# Patient Record
Sex: MALE | Race: WHITE | NOT HISPANIC OR LATINO | Employment: FULL TIME | URBAN - METROPOLITAN AREA
[De-identification: names, ages, dates, MRNs, and addresses within clinical notes are randomized per-mention and may not be internally consistent; named-entity substitution may affect disease eponyms.]

---

## 2020-02-25 ENCOUNTER — TELEPHONE (OUTPATIENT)
Dept: URGENT CARE | Facility: PHYSICIAN GROUP | Age: 35
End: 2020-02-25

## 2020-02-25 ENCOUNTER — OFFICE VISIT (OUTPATIENT)
Dept: URGENT CARE | Facility: PHYSICIAN GROUP | Age: 35
End: 2020-02-25
Payer: COMMERCIAL

## 2020-02-25 VITALS
DIASTOLIC BLOOD PRESSURE: 58 MMHG | BODY MASS INDEX: 23.34 KG/M2 | HEART RATE: 96 BPM | SYSTOLIC BLOOD PRESSURE: 98 MMHG | TEMPERATURE: 98.5 F | WEIGHT: 163 LBS | OXYGEN SATURATION: 98 % | HEIGHT: 70 IN | RESPIRATION RATE: 16 BRPM

## 2020-02-25 DIAGNOSIS — Z72.51 HIGH RISK HETEROSEXUAL BEHAVIOR: ICD-10-CM

## 2020-02-25 DIAGNOSIS — Z20.2 EXPOSURE TO CHLAMYDIA: ICD-10-CM

## 2020-02-25 PROCEDURE — 99203 OFFICE O/P NEW LOW 30 MIN: CPT | Mod: 25 | Performed by: PHYSICIAN ASSISTANT

## 2020-02-25 RX ORDER — AZITHROMYCIN 500 MG/1
1000 TABLET, FILM COATED ORAL ONCE
Qty: 2 TAB | Refills: 0 | Status: SHIPPED | OUTPATIENT
Start: 2020-02-25 | End: 2020-02-25

## 2020-02-26 ENCOUNTER — HOSPITAL ENCOUNTER (OUTPATIENT)
Facility: MEDICAL CENTER | Age: 35
End: 2020-02-26
Attending: PHYSICIAN ASSISTANT
Payer: COMMERCIAL

## 2020-02-26 DIAGNOSIS — Z20.2 EXPOSURE TO CHLAMYDIA: ICD-10-CM

## 2020-02-26 PROCEDURE — 87591 N.GONORRHOEAE DNA AMP PROB: CPT

## 2020-02-26 PROCEDURE — 87491 CHLMYD TRACH DNA AMP PROBE: CPT

## 2020-02-26 NOTE — TELEPHONE ENCOUNTER
Called the patient to notify him that he will need to leave a urine sample when he stops at the lab for his blood work he will need to leave a urine sample as well for the GC Chlamydia test.

## 2020-02-26 NOTE — PROGRESS NOTES
Subjective:      Dimas Magallon is a 35 y.o. male who presents with Sexually Transmitted Diseases (he advised that he was told yesterday to get tested )    HPI:   This is a new problem. Dimas Magallon is a 35 y.o. male who presents today for evaluation of possible STD.  Patient has been sexually active with multiple females in the past few months.  He says he was notified by 1 of his sexual partners that she had chlamydia.  He is not having any symptoms.  No overall penile discharge, burning with urination, joint pain, rash, abdominal pain, or fever/chills.  He has no history of STDs.      Review of Systems   Constitutional: Negative for chills, fever and malaise/fatigue.   HENT: Negative for sore throat.    Eyes: Negative for blurred vision.   Respiratory: Negative for shortness of breath.    Cardiovascular: Negative for chest pain and palpitations.   Gastrointestinal: Negative for abdominal pain, nausea and vomiting.   Genitourinary: Negative for dysuria, flank pain, frequency and urgency.   Musculoskeletal: Negative for joint pain.   Skin: Negative for rash.   Neurological: Negative for dizziness.   Endo/Heme/Allergies: Does not bruise/bleed easily.       PMH:  has no past medical history on file.  MEDS:   Current Outpatient Medications:   •  azithromycin (ZITHROMAX) 500 MG tablet, Take 2 Tabs by mouth Once for 1 dose., Disp: 2 Tab, Rfl: 0  •  fluticasone (FLONASE) 50 MCG/ACT nasal spray, Spray 2 Sprays in nose every day. Each Nostril (Patient not taking: Reported on 2/25/2020), Disp: 1 Bottle, Rfl: 0  •  oxycodone-acetaminophen (PERCOCET) 5-325 MG TABS, Take 1-2 Tabs by mouth every 6 hours as needed for Mild Pain. (Patient not taking: Reported on 2/25/2020), Disp: 10 Each, Rfl: 0    Current Facility-Administered Medications:   •  cefTRIAXone (ROCEPHIN) 250 mg, lidocaine (XYLOCAINE) 1 % 0.9 mL for IM use, 250 mg, Intramuscular, Once, Bertha Atkinson P.A.-C.  ALLERGIES:   Allergies   Allergen Reactions   • Vicodin  "[Hydrocodone-Acetaminophen]      SURGHX:   Past Surgical History:   Procedure Laterality Date   • EYE SURGERY     • OTHER ORTHOPEDIC SURGERY       SOCHX:  reports that he has been smoking cigarettes. He has been smoking about 0.50 packs per day. He does not have any smokeless tobacco history on file. He reports current alcohol use.  FH: Family history was reviewed, no pertinent findings to report     Objective:     BP (!) 98/58 (BP Location: Left arm, Patient Position: Sitting, BP Cuff Size: Adult)   Pulse 96   Temp 36.9 °C (98.5 °F) (Temporal)   Resp 16   Ht 1.778 m (5' 10\")   Wt 73.9 kg (163 lb)   SpO2 98%   BMI 23.39 kg/m²      Physical Exam  Constitutional:       Appearance: Normal appearance. He is well-developed.   HENT:      Head: Normocephalic and atraumatic.      Right Ear: External ear normal.      Left Ear: External ear normal.   Eyes:      Conjunctiva/sclera: Conjunctivae normal.      Pupils: Pupils are equal, round, and reactive to light.   Cardiovascular:      Rate and Rhythm: Normal rate and regular rhythm.      Heart sounds: No murmur.   Pulmonary:      Effort: Pulmonary effort is normal.      Breath sounds: Normal breath sounds.   Abdominal:      Palpations: Abdomen is soft.      Tenderness: There is no abdominal tenderness. There is no right CVA tenderness or left CVA tenderness.   Genitourinary:     Penis: Normal and circumcised. No discharge.       Scrotum/Testes: Normal. Cremasteric reflex is present.      Epididymis:      Right: Normal.      Left: Normal.   Lymphadenopathy:      Lower Body: No right inguinal adenopathy. No left inguinal adenopathy.   Skin:     General: Skin is warm and dry.      Capillary Refill: Capillary refill takes less than 2 seconds.   Neurological:      Mental Status: He is alert and oriented to person, place, and time.   Psychiatric:         Behavior: Behavior normal.         Judgment: Judgment normal.            Assessment/Plan:       1. Exposure to chlamydia  - " cefTRIAXone (ROCEPHIN) 250 mg, lidocaine (XYLOCAINE) 1 % 0.9 mL for IM use  - azithromycin (ZITHROMAX) 500 MG tablet; Take 2 Tabs by mouth Once for 1 dose.  Dispense: 2 Tab; Refill: 0  - CHLAMYDIA/GC PCR URINE OR SWAB; Future    2. High risk heterosexual behavior  - HIV AG/AB COMBO ASSAY SCREENING; Future  - T.PALLIDUM AB EIA; Future      *Patient was advised to abstain from sexual contact no the results of the STD tests are in.          Differential Diagnosis, natural history, and supportive care discussed. Return to the Urgent Care or follow up with your PCP if symptoms fail to resolve, or for any new or worsening symptoms. Emergency room precautions discussed. Patient and/or family appears understanding of information.

## 2020-02-28 LAB
C TRACH DNA SPEC QL NAA+PROBE: NEGATIVE
N GONORRHOEA DNA SPEC QL NAA+PROBE: NEGATIVE
SPECIMEN SOURCE: NORMAL

## 2020-03-01 ENCOUNTER — TELEPHONE (OUTPATIENT)
Dept: URGENT CARE | Facility: CLINIC | Age: 35
End: 2020-03-01

## 2020-03-02 NOTE — TELEPHONE ENCOUNTER
Phone Number Called: [phone] (home)       Call outcome: Spoke to patient regarding message below.    Message: did let pt know that the test for chlamydia and gc came back negative

## 2020-03-04 ASSESSMENT — ENCOUNTER SYMPTOMS
FLANK PAIN: 0
SHORTNESS OF BREATH: 0
BLURRED VISION: 0
CHILLS: 0
ABDOMINAL PAIN: 0
SORE THROAT: 0
VOMITING: 0
BRUISES/BLEEDS EASILY: 0
DIZZINESS: 0
FEVER: 0
NAUSEA: 0
PALPITATIONS: 0

## 2020-05-21 ENCOUNTER — OFFICE VISIT (OUTPATIENT)
Dept: URGENT CARE | Facility: PHYSICIAN GROUP | Age: 35
End: 2020-05-21
Payer: COMMERCIAL

## 2020-05-21 VITALS
RESPIRATION RATE: 16 BRPM | TEMPERATURE: 98 F | HEIGHT: 70 IN | HEART RATE: 82 BPM | OXYGEN SATURATION: 97 % | DIASTOLIC BLOOD PRESSURE: 84 MMHG | BODY MASS INDEX: 23.62 KG/M2 | SYSTOLIC BLOOD PRESSURE: 120 MMHG | WEIGHT: 165 LBS

## 2020-05-21 DIAGNOSIS — B35.4 TINEA CORPORIS: Primary | ICD-10-CM

## 2020-05-21 PROCEDURE — 99214 OFFICE O/P EST MOD 30 MIN: CPT | Performed by: PHYSICIAN ASSISTANT

## 2020-05-21 RX ORDER — HYDROXYZINE HYDROCHLORIDE 25 MG/1
25 TABLET, FILM COATED ORAL 3 TIMES DAILY PRN
Qty: 30 TAB | Refills: 0 | Status: SHIPPED | OUTPATIENT
Start: 2020-05-21 | End: 2020-05-31

## 2020-05-21 RX ORDER — FLUCONAZOLE 150 MG/1
150 TABLET ORAL DAILY
Qty: 21 TAB | Refills: 0 | Status: SHIPPED | OUTPATIENT
Start: 2020-05-21 | End: 2020-06-11

## 2020-05-21 NOTE — PROGRESS NOTES
Subjective:      Pt is a 35 y.o. male who presents with Rash (started in lower back going up to shoulders, between fingers, itchy, burning, x2 months )            HPI  ,This is a new problem. Pt notes 2 months of spreading and itching rash on back and moving to chest and hands with itching. He notes Lotrimin and Triamcinolone is not helping. Pt has not taken any Rx medications for this condition. Pt states the pain is a 5/10 with itching, aching in nature and worse at night. Pt denies new detergents, soaps, make-up, hygiene products, medications, foods, exposure to chemicals. Pt denies CP, SOB, NVD, paresthesias, headaches, dizziness, change in vision, hives, or other joint pain. The pt's medication list, problem list, and allergies have been evaluated and reviewed during today's visit.    PMH:  Negative per pt.      PSH:  Past Surgical History:   Procedure Laterality Date   • EYE SURGERY     • OTHER ORTHOPEDIC SURGERY         Fam Hx:    family history includes Hyperlipidemia in his father; Hypertension in his father.  Family Status   Relation Name Status   • Mo  Alive   • Fa  Alive       Soc HX:  Social History     Socioeconomic History   • Marital status: Single     Spouse name: Not on file   • Number of children: Not on file   • Years of education: Not on file   • Highest education level: Not on file   Occupational History   • Not on file   Social Needs   • Financial resource strain: Not on file   • Food insecurity     Worry: Not on file     Inability: Not on file   • Transportation needs     Medical: Not on file     Non-medical: Not on file   Tobacco Use   • Smoking status: Current Every Day Smoker     Packs/day: 0.50     Types: Cigarettes   • Smokeless tobacco: Never Used   Substance and Sexual Activity   • Alcohol use: Yes   • Drug use: Not on file   • Sexual activity: Not on file   Lifestyle   • Physical activity     Days per week: Not on file     Minutes per session: Not on file   • Stress: Not on file    "  Relationships   • Social connections     Talks on phone: Not on file     Gets together: Not on file     Attends Holiness service: Not on file     Active member of club or organization: Not on file     Attends meetings of clubs or organizations: Not on file     Relationship status: Not on file   • Intimate partner violence     Fear of current or ex partner: Not on file     Emotionally abused: Not on file     Physically abused: Not on file     Forced sexual activity: Not on file   Other Topics Concern   • Not on file   Social History Narrative   • Not on file         Medications:    Current Outpatient Medications:   •  hydrOXYzine HCl (ATARAX) 25 MG Tab, Take 1 Tab by mouth 3 times a day as needed for Itching for up to 10 days., Disp: 30 Tab, Rfl: 0  •  fluconazole (DIFLUCAN) 150 MG tablet, Take 1 Tab by mouth every day for 21 days., Disp: 21 Tab, Rfl: 0      Allergies:  Vicodin [hydrocodone-acetaminophen]    ROS  Constitutional: Negative for fever, chills and malaise/fatigue.   HENT: Negative for congestion and sore throat.    Eyes: Negative for blurred vision, double vision and photophobia.   Respiratory: Negative for cough and shortness of breath.  Cardiovascular: Negative for chest pain and palpitations.   Gastrointestinal: Negative for heartburn, nausea, vomiting, abdominal pain, diarrhea and constipation.   Genitourinary: Negative for dysuria and flank pain.   Musculoskeletal: Negative for joint pain and myalgias.   Skin: POS for itching and rash.   Neurological: Negative for dizziness, tingling and headaches.   Endo/Heme/Allergies: Does not bruise/bleed easily.   Psychiatric/Behavioral: Negative for depression. The patient is not nervous/anxious.           Objective:     /84 (BP Location: Right arm, Patient Position: Sitting, BP Cuff Size: Adult)   Pulse 82   Temp 36.7 °C (98 °F) (Temporal)   Resp 16   Ht 1.778 m (5' 10\")   Wt 74.8 kg (165 lb)   SpO2 97%   BMI 23.68 kg/m²      Physical " Exam  Skin:     General: Skin is warm.      Capillary Refill: Capillary refill takes less than 2 seconds.      Findings: Rash present. Rash is macular and papular.      Nails: There is no clubbing.                     Constitutional: PT is oriented to person, place, and time. PT appears well-developed and well-nourished. No distress.   HENT:   Head: Normocephalic and atraumatic.   Mouth/Throat: Oropharynx is clear and moist. No oropharyngeal exudate.   Eyes: Conjunctivae normal and EOM are normal. Pupils are equal, round, and reactive to light.   Neck: Normal range of motion. Neck supple. No thyromegaly present.   Cardiovascular: Normal rate, regular rhythm, normal heart sounds and intact distal pulses.  Exam reveals no gallop and no friction rub.    No murmur heard.  Pulmonary/Chest: Effort normal and breath sounds normal. No respiratory distress. PT has no wheezes. PT has no rales. Pt exhibits no tenderness.   Abdominal: Soft. Bowel sounds are normal. PT exhibits no distension and no mass. There is no tenderness. There is no rebound and no guarding.   Musculoskeletal: Normal range of motion. PT exhibits no edema and no tenderness.   Neurological: PT is alert and oriented to person, place, and time. PT has normal reflexes. No cranial nerve deficit.       Psychiatric: PT has a normal mood and affect. PT behavior is normal. Judgment and thought content normal.        Assessment/Plan:       1. Tinea corporis    - hydrOXYzine HCl (ATARAX) 25 MG Tab; Take 1 Tab by mouth 3 times a day as needed for Itching for up to 10 days.  Dispense: 30 Tab; Refill: 0  - fluconazole (DIFLUCAN) 150 MG tablet; Take 1 Tab by mouth every day for 21 days.  Dispense: 21 Tab; Refill: 0    Rash in nature of tinea and since topical triamcinolone and lotrimin did not work x 2 weeks I will move to oral antifungal: Diflucan and encourage continued OTC topical use of antifungal cream as well.  Rest, fluids encouraged.  AVS with medical info  given.  Pt was in full understanding and agreement with the plan.  Differential diagnosis, natural history, supportive care, and indications for immediate follow-up discussed. All questions answered. Patient agrees with the plan of care.  Follow-up as needed if symptoms worsen or fail to improve to PCP, Urgent care or Emergency Room.

## 2020-06-18 ENCOUNTER — OFFICE VISIT (OUTPATIENT)
Dept: URGENT CARE | Facility: PHYSICIAN GROUP | Age: 35
End: 2020-06-18
Payer: COMMERCIAL

## 2020-06-18 VITALS
OXYGEN SATURATION: 98 % | DIASTOLIC BLOOD PRESSURE: 80 MMHG | TEMPERATURE: 98.4 F | SYSTOLIC BLOOD PRESSURE: 118 MMHG | HEIGHT: 70 IN | WEIGHT: 165 LBS | RESPIRATION RATE: 18 BRPM | BODY MASS INDEX: 23.62 KG/M2 | HEART RATE: 82 BPM

## 2020-06-18 DIAGNOSIS — B35.4 TINEA CORPORIS: ICD-10-CM

## 2020-06-18 DIAGNOSIS — L98.9 SKIN LESION OF NECK: ICD-10-CM

## 2020-06-18 PROCEDURE — 99214 OFFICE O/P EST MOD 30 MIN: CPT | Performed by: NURSE PRACTITIONER

## 2020-06-18 RX ORDER — CLOTRIMAZOLE AND BETAMETHASONE DIPROPIONATE 10; .64 MG/G; MG/G
1 CREAM TOPICAL 2 TIMES DAILY
Qty: 1 TUBE | Refills: 1 | Status: SHIPPED | OUTPATIENT
Start: 2020-06-18 | End: 2021-03-12

## 2020-06-18 RX ORDER — FLUCONAZOLE 150 MG/1
TABLET ORAL
Qty: 21 TAB | Refills: 0 | Status: SHIPPED | OUTPATIENT
Start: 2020-06-18 | End: 2021-03-12

## 2020-06-18 RX ORDER — FLUCONAZOLE 150 MG/1
150 TABLET ORAL DAILY
COMMUNITY
End: 2021-03-12

## 2020-06-18 ASSESSMENT — ENCOUNTER SYMPTOMS
CONSTIPATION: 0
TINGLING: 0
WHEEZING: 0
FEVER: 0
HEADACHES: 0
CHILLS: 0
NAUSEA: 0
DIZZINESS: 0
SENSORY CHANGE: 0
MYALGIAS: 0
DIARRHEA: 0
ORTHOPNEA: 0
VOMITING: 0
PALPITATIONS: 0
WEAKNESS: 0
ABDOMINAL PAIN: 0
SHORTNESS OF BREATH: 0

## 2020-06-18 NOTE — PROGRESS NOTES
Subjective:      Dimas Magallon is a 35 y.o. male who presents with Rash (Rash on back and arms, ringworm, pt got medication for it but its not helping, x4 months )            HPI  C/o ringworm still present. Has lesions on left arm and 2 lesions on back. Seen in UC 1 month ago and placed on fluconazole. States rash improved but still present. Admits to sleeping with his dog. Unknown if dog has rash.     PMH:  has no past medical history on file.  MEDS:   Current Outpatient Medications:   •  clotrimazole-betamethasone (LOTRISONE) 1-0.05 % Cream, Apply 1 Application to affected area(s) 2 times a day., Disp: 1 Tube, Rfl: 1  •  fluconazole (DIFLUCAN) 150 MG tablet, Take 1 tab by mouth daily x 21 days., Disp: 21 Tab, Rfl: 0  •  fluconazole (DIFLUCAN) 150 MG tablet, Take 150 mg by mouth every day., Disp: , Rfl:   ALLERGIES:   Allergies   Allergen Reactions   • Vicodin [Hydrocodone-Acetaminophen]      SURGHX:   Past Surgical History:   Procedure Laterality Date   • EYE SURGERY     • OTHER ORTHOPEDIC SURGERY       SOCHX:  reports that he has been smoking cigarettes. He has been smoking about 0.50 packs per day. He has never used smokeless tobacco. He reports current alcohol use.  FH: Family history was reviewed, no pertinent findings to report    Review of Systems   Constitutional: Negative for chills, fever and malaise/fatigue.   Respiratory: Negative for shortness of breath and wheezing.    Cardiovascular: Negative for chest pain, palpitations and orthopnea.   Gastrointestinal: Negative for abdominal pain, constipation, diarrhea, nausea and vomiting.   Musculoskeletal: Negative for myalgias.   Skin: Positive for rash. Negative for itching.   Neurological: Negative for dizziness, tingling, sensory change, weakness and headaches.   Endo/Heme/Allergies: Negative for environmental allergies.   All other systems reviewed and are negative.         Objective:     /80 (BP Location: Right arm, Patient Position: Sitting, BP  "Cuff Size: Adult)   Pulse 82   Temp 36.9 °C (98.4 °F) (Temporal)   Resp 18   Ht 1.778 m (5' 10\")   Wt 74.8 kg (165 lb)   SpO2 98%   BMI 23.68 kg/m²      Physical Exam  Vitals signs reviewed.   Constitutional:       General: He is awake. He is not in acute distress.     Appearance: Normal appearance. He is well-developed and well-groomed. He is not ill-appearing, toxic-appearing or diaphoretic.   HENT:      Head: Normocephalic.   Eyes:      Conjunctiva/sclera: Conjunctivae normal.      Pupils: Pupils are equal, round, and reactive to light.   Neck:      Musculoskeletal: Normal range of motion and neck supple.   Cardiovascular:      Rate and Rhythm: Normal rate and regular rhythm.      Heart sounds: Normal heart sounds.   Pulmonary:      Effort: Pulmonary effort is normal. No accessory muscle usage or respiratory distress.   Musculoskeletal: Normal range of motion.   Skin:     General: Skin is warm and dry.      Findings: Erythema and rash present. Rash is macular.             Comments: 3 similar macular circular lesions on left arm and back.    Neurological:      Mental Status: He is alert and oriented to person, place, and time.   Psychiatric:         Behavior: Behavior is cooperative.                 Assessment/Plan:       1. Tinea corporis    - clotrimazole-betamethasone (LOTRISONE) 1-0.05 % Cream; Apply 1 Application to affected area(s) 2 times a day.  Dispense: 1 Tube; Refill: 1  - REFERRAL TO DERMATOLOGY  - fluconazole (DIFLUCAN) 150 MG tablet; Take 1 tab by mouth daily x 21 days.  Dispense: 21 Tab; Refill: 0    2. Skin lesion of neck (patient requesting for derm referral for chronic skin lesion)    - REFERRAL TO DERMATOLOGY    May clean area with mild soap, do not scrub, wash with tepid water, tap dry  May moisturize skin with neutral lotion like Gold Bond, Cerave, Eucerin  Monitor for increase in rash size or areas affected, pain, itchiness, redness with blistering- re-evaluate  F/u Derm for ringworm " for long term treatment and evaluation of lesion

## 2021-01-29 ENCOUNTER — HOSPITAL ENCOUNTER (OUTPATIENT)
Facility: MEDICAL CENTER | Age: 36
End: 2021-01-29
Attending: PHYSICIAN ASSISTANT
Payer: COMMERCIAL

## 2021-01-29 ENCOUNTER — OFFICE VISIT (OUTPATIENT)
Dept: URGENT CARE | Facility: CLINIC | Age: 36
End: 2021-01-29
Payer: COMMERCIAL

## 2021-01-29 VITALS
RESPIRATION RATE: 16 BRPM | BODY MASS INDEX: 23.62 KG/M2 | HEART RATE: 80 BPM | DIASTOLIC BLOOD PRESSURE: 72 MMHG | HEIGHT: 70 IN | TEMPERATURE: 97.2 F | SYSTOLIC BLOOD PRESSURE: 104 MMHG | WEIGHT: 165 LBS | OXYGEN SATURATION: 98 %

## 2021-01-29 DIAGNOSIS — R21 RASH AND NONSPECIFIC SKIN ERUPTION: ICD-10-CM

## 2021-01-29 DIAGNOSIS — N48.1 BALANITIS: ICD-10-CM

## 2021-01-29 DIAGNOSIS — R30.0 DYSURIA: ICD-10-CM

## 2021-01-29 DIAGNOSIS — B00.9 HSV-1 INFECTION: ICD-10-CM

## 2021-01-29 DIAGNOSIS — N34.2 URETHRITIS: ICD-10-CM

## 2021-01-29 LAB
APPEARANCE UR: CLEAR
BILIRUB UR STRIP-MCNC: NORMAL MG/DL
COLOR UR AUTO: YELLOW
GLUCOSE UR STRIP.AUTO-MCNC: NORMAL MG/DL
KETONES UR STRIP.AUTO-MCNC: NORMAL MG/DL
LEUKOCYTE ESTERASE UR QL STRIP.AUTO: NORMAL
NITRITE UR QL STRIP.AUTO: NORMAL
PH UR STRIP.AUTO: 5.5 [PH] (ref 5–8)
PROT UR QL STRIP: NORMAL MG/DL
RBC UR QL AUTO: NORMAL
SP GR UR STRIP.AUTO: 1.03
UROBILINOGEN UR STRIP-MCNC: 0.2 MG/DL

## 2021-01-29 PROCEDURE — 87491 CHLMYD TRACH DNA AMP PROBE: CPT

## 2021-01-29 PROCEDURE — 87529 HSV DNA AMP PROBE: CPT

## 2021-01-29 PROCEDURE — 87205 SMEAR GRAM STAIN: CPT

## 2021-01-29 PROCEDURE — 81002 URINALYSIS NONAUTO W/O SCOPE: CPT | Performed by: PHYSICIAN ASSISTANT

## 2021-01-29 PROCEDURE — 99214 OFFICE O/P EST MOD 30 MIN: CPT | Performed by: PHYSICIAN ASSISTANT

## 2021-01-29 PROCEDURE — 87591 N.GONORRHOEAE DNA AMP PROB: CPT

## 2021-01-29 PROCEDURE — 87070 CULTURE OTHR SPECIMN AEROBIC: CPT

## 2021-01-29 RX ORDER — TRIAMCINOLONE ACETONIDE 1 MG/G
1 OINTMENT TOPICAL 2 TIMES DAILY
Qty: 15 G | Refills: 0 | Status: SHIPPED | OUTPATIENT
Start: 2021-01-29 | End: 2021-03-12

## 2021-01-29 RX ORDER — DOXYCYCLINE HYCLATE 100 MG
100 TABLET ORAL 2 TIMES DAILY
Qty: 14 TAB | Refills: 0 | Status: SHIPPED | OUTPATIENT
Start: 2021-01-29 | End: 2021-02-05

## 2021-01-29 RX ORDER — CEPHALEXIN 500 MG/1
500 CAPSULE ORAL 4 TIMES DAILY
Qty: 28 CAP | Refills: 0 | Status: SHIPPED | OUTPATIENT
Start: 2021-01-29 | End: 2021-02-05

## 2021-01-29 SDOH — HEALTH STABILITY: MENTAL HEALTH: HOW OFTEN DO YOU HAVE A DRINK CONTAINING ALCOHOL?: 2-3 TIMES A WEEK

## 2021-01-29 ASSESSMENT — ENCOUNTER SYMPTOMS
CHILLS: 0
FLANK PAIN: 0
FEVER: 0

## 2021-01-29 NOTE — PROGRESS NOTES
"Subjective:   Dimas Magallon is a 35 y.o. male who presents for Penis Pain (near head ), Dysuria (x1 week ), and Rash (\"for long, long, long time\" )        This is an acute problem.  Patient presents with concerns of dysuria x7 days and rash x 8 months.  Dysuria came on gradually and he is experiencing pain and swelling at the tip of his penis.  Additionally he did developed swelling around the end of the shaft of the penis.  He is able to fully void.  No penile discharge or hematuria.  No urinary frequency or urgency.  Denies nausea, vomiting, fevers, chills, testicular pain.  He states that the rash has always been dry and flaky.  He occasionally applies Neosporin to it.  He denies vesicular lesions.  Denies history of HSV, G/C.  He was tested for STIs in June after a new encounter and states that all his testing was negative.  He took 4 doses of Diflucan that he was previously prescribed for ringworm for rash last week.  This did not improve his symptoms.  No history of kidney stones.  He has never had symptoms of this nature in the past.    Review of Systems   Constitutional: Negative for chills and fever.   Genitourinary: Positive for dysuria. Negative for flank pain, frequency, hematuria and urgency.       PMH:  has no past medical history of Urinary tract infection.  MEDS:   Current Outpatient Medications:   •  doxycycline (VIBRAMYCIN) 100 MG Tab, Take 1 Tab by mouth 2 times a day for 7 days., Disp: 14 Tab, Rfl: 0  •  cephALEXin (KEFLEX) 500 MG Cap, Take 1 Cap by mouth 4 times a day for 7 days., Disp: 28 Cap, Rfl: 0  •  triamcinolone acetonide (KENALOG) 0.1 % Ointment, Apply 1 Units topically 2 times a day., Disp: 15 g, Rfl: 0  •  fluconazole (DIFLUCAN) 150 MG tablet, Take 150 mg by mouth every day., Disp: , Rfl:   •  clotrimazole-betamethasone (LOTRISONE) 1-0.05 % Cream, Apply 1 Application to affected area(s) 2 times a day., Disp: 1 Tube, Rfl: 1  •  fluconazole (DIFLUCAN) 150 MG tablet, Take 1 tab by mouth " "daily x 21 days., Disp: 21 Tab, Rfl: 0  ALLERGIES:   Allergies   Allergen Reactions   • Vicodin [Hydrocodone-Acetaminophen]      SURGHX:   Past Surgical History:   Procedure Laterality Date   • EYE SURGERY     • OTHER ORTHOPEDIC SURGERY       SOCHX:  reports that he has been smoking cigarettes. He has been smoking about 0.50 packs per day. He has never used smokeless tobacco. He reports current alcohol use. He reports that he does not use drugs.  FH: Family history was reviewed, no pertinent findings to report   Objective:   /72   Pulse 80   Temp 36.2 °C (97.2 °F) (Temporal)   Resp 16   Ht 1.778 m (5' 10\")   Wt 74.8 kg (165 lb)   SpO2 98%   BMI 23.68 kg/m²   Physical Exam  Vitals signs reviewed.   Constitutional:       General: He is not in acute distress.     Appearance: Normal appearance. He is well-developed. He is not toxic-appearing.   HENT:      Head: Normocephalic and atraumatic.      Right Ear: External ear normal.      Left Ear: External ear normal.      Nose: Nose normal.   Eyes:      General: Gaze aligned appropriately.   Neck:      Musculoskeletal: Neck supple.   Cardiovascular:      Rate and Rhythm: Normal rate and regular rhythm.   Pulmonary:      Effort: Pulmonary effort is normal. No respiratory distress.      Breath sounds: No stridor.   Genitourinary:     Comments: Patient is circumcised.  There is an erythematous, will demarcated macular rash with overlying scale on the dorsal aspect of the glans.  There are some small satellite lesions.  No discharge.  No vesicles.  Mildly tender to palpation.    Glands is mildly edematous and distal shaft of the penis is moderately edematous and tender to palpation.  Significant inflammation and erythema of the urethra.  No discharge.    Testicles are descended-no erythema, no edema, hair distribution is normal.  Nontender.  Skin:     General: Skin is warm and dry.      Capillary Refill: Capillary refill takes less than 2 seconds.   Neurological:    "   Mental Status: He is alert and oriented to person, place, and time.      Comments: CN2-12 grossly intact   Psychiatric:         Speech: Speech normal.         Behavior: Behavior normal.           Assessment/Plan:   1. Urethritis  - doxycycline (VIBRAMYCIN) 100 MG Tab; Take 1 Tab by mouth 2 times a day for 7 days.  Dispense: 14 Tab; Refill: 0  - cephALEXin (KEFLEX) 500 MG Cap; Take 1 Cap by mouth 4 times a day for 7 days.  Dispense: 28 Cap; Refill: 0  - Viral Culture, Rapid, Lesion  - REFERRAL TO UROLOGY    2. Balanitis  - doxycycline (VIBRAMYCIN) 100 MG Tab; Take 1 Tab by mouth 2 times a day for 7 days.  Dispense: 14 Tab; Refill: 0  - cephALEXin (KEFLEX) 500 MG Cap; Take 1 Cap by mouth 4 times a day for 7 days.  Dispense: 28 Cap; Refill: 0  - Viral Culture, Rapid, Lesion  - REFERRAL TO UROLOGY    3. Rash and nonspecific skin eruption  - REFERRAL TO UROLOGY  - triamcinolone acetonide (KENALOG) 0.1 % Ointment; Apply 1 Units topically 2 times a day.  Dispense: 15 g; Refill: 0    4. Dysuria  - POCT Urinalysis  - CHLAMYDIA/GC PCR URINE OR SWAB; Future  - CULTURE WOUND W/ GRAM STAIN; Future  - cephALEXin (KEFLEX) 500 MG Cap; Take 1 Cap by mouth 4 times a day for 7 days.  Dispense: 28 Cap; Refill: 0  - Viral Culture, Rapid, Lesion  - REFERRAL TO UROLOGY    Records reviewed.  Prior testing for gonorrhea and chlamydia was negative.  He was not tested for HSV.      Physical exam consistent with urethritis, balanitis, and soft tissue infection.  Distribution of rash is somewhat suggestive of HSV.  However there are no vesicles.  He does have some overlying dry skin that is more suggestive of a dermatitis.  He adamantly denies that vesicles were ever present. His previous partner has no symptoms. Rash does not look mycotic and he had no response to multiple doses of Diflucan, making this less likely.    Due to significant swelling and discomfort patient started on empiric antibiotic therapy.  Medium dose topical steroid to be  applied twice a day to his rash.  If ointment makes his symptoms worse I would like him to discontinue this immediately.  Wound culture and Gram stain obtained.  Additionally I attempted to obtain a viral culture.  I will contact patient with results and adjust treatment plan as indicated.      If symptoms rapidly progress, he is unable to void, he develops signs of systemic infection or other severe and concerning symptoms-to ED.  I would like the patient to be rechecked in 48 to 72 hours.  Patient would prefer not to see a new provider and will be seen at [facility] [place] [place] at 9 AM on Monday morning.  He should return sooner if he has no response to antibiotic therapy in 48 hrs or develops new, non emergent symptoms.    If patient symptoms persist or fail to fully resolve he will likely require specialist evaluation.  Referral placed for urology.    Differential diagnosis, natural history, supportive care, and indications for immediate follow-up discussed.    ----  Discussed case with Dr. Anderson who recommends treating for bacterial posthitis and obtaining complete STI testing in addition to cultures. Pt contacted and instructed to go to lab for additional testing.

## 2021-01-30 ENCOUNTER — HOSPITAL ENCOUNTER (OUTPATIENT)
Dept: LAB | Facility: MEDICAL CENTER | Age: 36
End: 2021-01-30
Attending: PHYSICIAN ASSISTANT
Payer: COMMERCIAL

## 2021-01-30 ENCOUNTER — TELEPHONE (OUTPATIENT)
Dept: URGENT CARE | Facility: CLINIC | Age: 36
End: 2021-01-30

## 2021-01-30 DIAGNOSIS — R21 RASH AND NONSPECIFIC SKIN ERUPTION: ICD-10-CM

## 2021-01-30 DIAGNOSIS — B00.9 HSV-1 (HERPES SIMPLEX VIRUS 1) INFECTION: ICD-10-CM

## 2021-01-30 DIAGNOSIS — N48.1 BALANITIS: ICD-10-CM

## 2021-01-30 DIAGNOSIS — R30.0 DYSURIA: ICD-10-CM

## 2021-01-30 DIAGNOSIS — N34.2 URETHRITIS: ICD-10-CM

## 2021-01-30 LAB
C TRACH DNA SPEC QL NAA+PROBE: NEGATIVE
GRAM STN SPEC: NORMAL
HIV 1+2 AB+HIV1 P24 AG SERPL QL IA: NORMAL
HSV1 DNA SPEC QL NAA+PROBE: POSITIVE
HSV2 DNA SPEC QL NAA+PROBE: NEGATIVE
N GONORRHOEA DNA SPEC QL NAA+PROBE: NEGATIVE
SIGNIFICANT IND 70042: NORMAL
SITE SITE: NORMAL
SOURCE SOURCE: NORMAL
SPECIMEN SOURCE: ABNORMAL
SPECIMEN SOURCE: NORMAL
TREPONEMA PALLIDUM IGG+IGM AB [PRESENCE] IN SERUM OR PLASMA BY IMMUNOASSAY: NORMAL

## 2021-01-30 PROCEDURE — 86780 TREPONEMA PALLIDUM: CPT

## 2021-01-30 PROCEDURE — 86696 HERPES SIMPLEX TYPE 2 TEST: CPT

## 2021-01-30 PROCEDURE — 87389 HIV-1 AG W/HIV-1&-2 AB AG IA: CPT

## 2021-01-30 PROCEDURE — 86695 HERPES SIMPLEX TYPE 1 TEST: CPT

## 2021-01-30 PROCEDURE — 36415 COLL VENOUS BLD VENIPUNCTURE: CPT

## 2021-01-30 PROCEDURE — 86694 HERPES SIMPLEX NES ANTBDY: CPT

## 2021-01-30 RX ORDER — VALACYCLOVIR HYDROCHLORIDE 1 G/1
1000 TABLET, FILM COATED ORAL 2 TIMES DAILY
Qty: 14 TAB | Refills: 0 | Status: SHIPPED | OUTPATIENT
Start: 2021-01-30 | End: 2021-02-06

## 2021-01-30 NOTE — TELEPHONE ENCOUNTER
Patient contacted regarding recent results.  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  Etiology and disease course discussed with patient.  I do recommend that he establish with PCP to ensure that he is antiviral medications on hand for future outbreaks.    Additionally I addressed patient complaint reported by Hailey Copeland MA, yesterday when she contacted him to inform him about additional testing.  Patient expressed concerns about being on multiple antibiotics and indicated that he was upset with the quality of his care at the time.  I addressed patient's concerns directly.  He apologized for his conduct during the phone call and states that he was stressed out about his health.  Care plan, medical decision making, and reason for each prescribed medications discussed at length with patient.  Patient given option to discontinue Keflex, but he did decide to continue this medication.  All additional questions and concerns answered.  He was told to reach out with any additional questions or concerns.  He was also given the option to follow-up with a different provider, but would like to follow-up with me as scheduled on Monday.  All questions and concerns addressed.  Patient indicated that he is very happy with his care..

## 2021-02-01 ENCOUNTER — OFFICE VISIT (OUTPATIENT)
Dept: URGENT CARE | Facility: CLINIC | Age: 36
End: 2021-02-01
Payer: COMMERCIAL

## 2021-02-01 VITALS
OXYGEN SATURATION: 96 % | SYSTOLIC BLOOD PRESSURE: 118 MMHG | TEMPERATURE: 97.3 F | DIASTOLIC BLOOD PRESSURE: 80 MMHG | WEIGHT: 179 LBS | RESPIRATION RATE: 14 BRPM | HEART RATE: 94 BPM | BODY MASS INDEX: 25.62 KG/M2 | HEIGHT: 70 IN

## 2021-02-01 DIAGNOSIS — A60.01 HERPES SIMPLEX INFECTION OF PENIS: ICD-10-CM

## 2021-02-01 DIAGNOSIS — N34.2 URETHRITIS: ICD-10-CM

## 2021-02-01 DIAGNOSIS — B00.9 HSV-1 INFECTION: ICD-10-CM

## 2021-02-01 DIAGNOSIS — N48.1 BALANITIS: ICD-10-CM

## 2021-02-01 LAB
BACTERIA WND AEROBE CULT: NORMAL
GRAM STN SPEC: NORMAL
SIGNIFICANT IND 70042: NORMAL
SITE SITE: NORMAL
SOURCE SOURCE: NORMAL

## 2021-02-01 PROCEDURE — 99214 OFFICE O/P EST MOD 30 MIN: CPT | Performed by: PHYSICIAN ASSISTANT

## 2021-02-01 ASSESSMENT — ENCOUNTER SYMPTOMS
VOMITING: 0
NAUSEA: 0
CHILLS: 0
FEVER: 0

## 2021-02-01 NOTE — PROGRESS NOTES
Subjective:   Dimas Magallon is a 35 y.o. male who presents for UTI (follow up on UTI)        Patient presents for reevaluation of genital rash and penile swelling.  I initially evaluated in clinic on 1/29/2021.  He was started on empiric antibiotics for soft tissue infection.  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  Patient subsequently started on Valtrex on 2/30/21.  He is also been applying topical corticosteroid 2 times a day.  Overall he reports that symptoms are much improved, but not fully resolved.  He continues to have some slight burning in the area of the rash, but also feels like this is improving.  Swelling and redness have gone down.  Discomfort with urination is decreased.  He gets a bit of an upset stomach with the medications, but otherwise states that he is tolerating them well.  Denies vomiting, fevers, chills, abdominal pain, flank pain.    Review of Systems   Constitutional: Negative for chills, fever and malaise/fatigue.   Gastrointestinal: Negative for nausea and vomiting.       PMH:  has no past medical history of Urinary tract infection.  MEDS:   Current Outpatient Medications:   •  valacyclovir (VALTREX) 1 GM Tab, Take 1 Tab by mouth 2 times a day for 7 days., Disp: 14 Tab, Rfl: 0  •  fluconazole (DIFLUCAN) 150 MG tablet, Take 150 mg by mouth every day., Disp: , Rfl:   •  clotrimazole-betamethasone (LOTRISONE) 1-0.05 % Cream, Apply 1 Application to affected area(s) 2 times a day., Disp: 1 Tube, Rfl: 1  •  doxycycline (VIBRAMYCIN) 100 MG Tab, Take 1 Tab by mouth 2 times a day for 7 days., Disp: 14 Tab, Rfl: 0  •  cephALEXin (KEFLEX) 500 MG Cap, Take 1 Cap by mouth 4 times a day for 7 days., Disp: 28 Cap, Rfl: 0  •  triamcinolone acetonide (KENALOG) 0.1 % Ointment, Apply 1 Units topically 2 times a day., Disp: 15 g, Rfl: 0  •  fluconazole (DIFLUCAN) 150 MG tablet, Take 1 tab by mouth daily x 21 days., Disp: 21 Tab, Rfl: 0  ALLERGIES:   Allergies   Allergen Reactions  "  • Vicodin [Hydrocodone-Acetaminophen]      SURGHX:   Past Surgical History:   Procedure Laterality Date   • EYE SURGERY     • OTHER ORTHOPEDIC SURGERY       SOCHX:  reports that he has been smoking cigarettes. He has been smoking about 0.50 packs per day. He has never used smokeless tobacco. He reports current alcohol use. He reports that he does not use drugs.  FH: Family history was reviewed, no pertinent findings to report   Objective:   /80 (BP Location: Left arm, Patient Position: Sitting, BP Cuff Size: Adult)   Pulse 94   Temp 36.3 °C (97.3 °F) (Temporal)   Resp 14   Ht 1.778 m (5' 10\")   Wt 81.2 kg (179 lb)   SpO2 96%   BMI 25.68 kg/m²   Physical Exam  Vitals signs reviewed.   Constitutional:       General: He is not in acute distress.     Appearance: Normal appearance. He is well-developed. He is not toxic-appearing.   HENT:      Head: Normocephalic and atraumatic.      Right Ear: External ear normal.      Left Ear: External ear normal.      Nose: Nose normal.   Eyes:      General: Gaze aligned appropriately.   Neck:      Musculoskeletal: Neck supple.   Cardiovascular:      Rate and Rhythm: Normal rate and regular rhythm.   Pulmonary:      Effort: Pulmonary effort is normal. No respiratory distress.      Breath sounds: No stridor.   Genitourinary:     Comments: Urethra is mildly inflamed.  No discharge.    Mildly erythematous macular rash on dorsum of the glans.  No overlying scale.  No vesicles.    Mild swelling of the distal shaft in vicinity of the foreskin remnant.  Mild erythema.  Skin:     General: Skin is warm and dry.      Capillary Refill: Capillary refill takes less than 2 seconds.   Neurological:      Mental Status: He is alert and oriented to person, place, and time.      Comments: CN2-12 grossly intact   Psychiatric:         Speech: Speech normal.         Behavior: Behavior normal.     Labs:    HSV by PCR: + for HSV 1    Results for orders placed or performed during the hospital " encounter of 01/30/21   HIV AG/AB COMBO ASSAY SCREENING   Result Value Ref Range    HIV Ag/Ab Combo Assay Non-Reactive Non Reactive   T.PALLIDUM AB EIA   Result Value Ref Range    Syphilis, Treponemal Qual Non-Reactive Non-Reactive     G/C- negative.  Wound culture- negative.      Assessment/Plan:   1. Herpes simplex infection of penis  - REFERRAL TO FOLLOW-UP WITH PRIMARY CARE    2. HSV-1 infection  - REFERRAL TO FOLLOW-UP WITH PRIMARY CARE    3. Urethritis  - REFERRAL TO FOLLOW-UP WITH PRIMARY CARE    4. Balanitis  - REFERRAL TO FOLLOW-UP WITH PRIMARY CARE    Patient reports significant symptomatic improvement with treatment plan.  I am happy with patient's progress-his physical exam is much improved from his exam 72 hours ago.  I would like him to continue oral medications and complete full course.  Additionally he may apply topical corticosteroid as needed, not to exceed 10 days.    Lab results reviewed with patient.  HSV antibody testing is still being processed.  I will contact patient with results upon receipt.  I do not anticipate any changes to his treatment plan.  Rediscussed diagnosis of herpes and how to address future breakouts.  I did place a referral for him to establish with primary so that he can have Valtrex on hand.  In the interim I would like patient to return to clinic with any unresolved symptoms, new symptoms, or any other concerns.    Differential diagnosis, natural history, supportive care, and indications for immediate follow-up discussed.

## 2021-02-02 ENCOUNTER — TELEPHONE (OUTPATIENT)
Dept: SCHEDULING | Facility: IMAGING CENTER | Age: 36
End: 2021-02-02

## 2021-02-02 LAB
HSV1 GG IGG SER-ACNC: 0.16 IV
HSV1+2 IGG SER IA-ACNC: 2.32 IV
HSV1+2 IGM SER IA-ACNC: 3.13 IV
HSV2 GG IGG SER-ACNC: 7.55 IV

## 2021-03-12 ENCOUNTER — OFFICE VISIT (OUTPATIENT)
Dept: MEDICAL GROUP | Facility: PHYSICIAN GROUP | Age: 36
End: 2021-03-12
Payer: COMMERCIAL

## 2021-03-12 VITALS
HEART RATE: 90 BPM | TEMPERATURE: 99 F | OXYGEN SATURATION: 98 % | SYSTOLIC BLOOD PRESSURE: 116 MMHG | DIASTOLIC BLOOD PRESSURE: 70 MMHG | WEIGHT: 173 LBS | HEIGHT: 70 IN | BODY MASS INDEX: 24.77 KG/M2

## 2021-03-12 DIAGNOSIS — Z13.6 SCREENING FOR CARDIOVASCULAR CONDITION: ICD-10-CM

## 2021-03-12 DIAGNOSIS — L72.0 EPIDERMOID CYST OF NECK: ICD-10-CM

## 2021-03-12 DIAGNOSIS — A60.01 HERPES SIMPLEX INFECTION OF PENIS: ICD-10-CM

## 2021-03-12 DIAGNOSIS — Z00.00 HEALTHCARE MAINTENANCE: ICD-10-CM

## 2021-03-12 DIAGNOSIS — Z72.0 TOBACCO USE: ICD-10-CM

## 2021-03-12 DIAGNOSIS — Z13.220 LIPID SCREENING: ICD-10-CM

## 2021-03-12 DIAGNOSIS — Z83.438 FAMILY HISTORY OF HYPERLIPIDEMIA: ICD-10-CM

## 2021-03-12 DIAGNOSIS — Z82.49 FAMILY HISTORY OF HYPERTENSION: ICD-10-CM

## 2021-03-12 PROCEDURE — 99214 OFFICE O/P EST MOD 30 MIN: CPT | Performed by: NURSE PRACTITIONER

## 2021-03-12 RX ORDER — NICOTINE 21 MG/24HR
1 PATCH, TRANSDERMAL 24 HOURS TRANSDERMAL EVERY 24 HOURS
Qty: 90 PATCH | Refills: 0 | Status: SHIPPED | OUTPATIENT
Start: 2021-03-12 | End: 2021-08-13 | Stop reason: SDUPTHER

## 2021-03-12 RX ORDER — VALACYCLOVIR HYDROCHLORIDE 1 G/1
1000 TABLET, FILM COATED ORAL 2 TIMES DAILY
Qty: 14 TABLET | Refills: 0 | Status: SHIPPED | OUTPATIENT
Start: 2021-03-12 | End: 2021-03-19

## 2021-03-12 ASSESSMENT — PATIENT HEALTH QUESTIONNAIRE - PHQ9: CLINICAL INTERPRETATION OF PHQ2 SCORE: 0

## 2021-03-12 NOTE — ASSESSMENT & PLAN NOTE
This is an acute condition that has been recently treated by urgent care.  Last treated 1/30/2021 with Valtrex for 7 days.  He reports that he has completed the full course of treatment.  He denies any further concerns, and is overall doing better since completing the treatment.

## 2021-03-12 NOTE — ASSESSMENT & PLAN NOTE
This is a chronic condition.  He has a cyst to the lower back of his neck that he would like to have excised.  He does endorse to me that he has had a couple of cysts excised in the past.  He is requesting for a dermatology referral to have this completed.

## 2021-03-12 NOTE — PROGRESS NOTES
Subjective  Chief Complaint  Establish care to manage his chronic conditions    History of Present Illness  Dimas Magallon is a 36 y.o. male. This patient is here today to establish care.  He has not had a primary care provider for over five years.    Epidermoid cyst of neck  This is a chronic condition.  He has a cyst to the lower back of his neck that he would like to have excised.  He does endorse to me that he has had a couple of cysts excised in the past.  He is requesting for a dermatology referral to have this completed.      Herpes simplex infection of penis  This is an acute condition that has been recently treated by urgent care.  Last treated 1/30/2021 with Valtrex for 7 days.  He reports that he has completed the full course of treatment.  He denies any further concerns, and is overall doing better since completing the treatment.      Tobacco use  This is a chronic condition. He smoking tobacco.  Years used:  18   Packs/day:  0.5   Previously quit:  No  --He is interested in quitting tobacco use.  He would like to start a method to help him quit tobacco.     Past Medical History    Allergies: Vicodin [hydrocodone-acetaminophen]  History reviewed. No pertinent past medical history.  Past Surgical History:   Procedure Laterality Date   • EYE SURGERY     • OTHER ORTHOPEDIC SURGERY Left     thumb     Current Outpatient Medications Ordered in Epic   Medication Sig Dispense Refill   • valacyclovir (VALTREX) 1 GM Tab Take 1 tablet by mouth 2 times a day for 7 days. 14 tablet 0   • nicotine (NICODERM) 21 MG/24HR PATCH 24 HR Place 1 Patch on the skin every 24 hours. 90 Patch 0     No current Epic-ordered facility-administered medications on file.     Family History:    Family History   Problem Relation Age of Onset   • Hyperlipidemia Father    • Hypertension Father    • Cancer Neg Hx    • Diabetes Neg Hx    • Heart Disease Neg Hx    • Stroke Neg Hx       Personal/Social History:    Social History     Tobacco Use   •  "Smoking status: Current Every Day Smoker     Packs/day: 0.50     Years: 18.00     Pack years: 9.00     Types: Cigarettes   • Smokeless tobacco: Never Used   Substance Use Topics   • Alcohol use: Yes     Alcohol/week: 3.0 oz     Types: 4 Cans of beer, 1 Shots of liquor per week   • Drug use: Yes     Comment: occ     Social History     Social History Narrative   • Not on file      Review of Systems:     General: Negative for fever/chills.    Eyes:  Negative for vision changes.   ENT:  Negative for hearing changes.    Respiratory:  Negative for cough and dyspnea.     Cardiovascular:  Negative for chest pain and palpitations.   Gastrointestinal:  Negative for nausea/vomiting.    Genitourinary:  Negative for dysuria.    Musculoskeletal:  Negative for myalgias.    Skin:  Negative for rash.    Neurological:  Negative for numbness/tingling.     Objective  Physical Exam:   /70 (BP Location: Left arm, Patient Position: Sitting, BP Cuff Size: Adult)   Pulse 90   Temp 37.2 °C (99 °F) (Temporal)   Ht 1.778 m (5' 10\")   Wt 78.5 kg (173 lb)   SpO2 98%  Body mass index is 24.82 kg/m².  General:  Alert and oriented.  Well appearing.  NAD.  Head:  Normocephalic.   Eyes:  Eyes conjunctiva clear lids without ptosis, pupils equal and reactive to light accommodation.    ENT: Ears normal shape and contour.    Neck: Supple without JVD. No lymphadenopathy.  Pulmonary:  Normal effort.  Clear to ausculation without rales, ronchi, or wheezing.  Cardiovascular:  Regular rate and rhythm without murmur, rubs or gallop.  Radial pulses are intact and equal bilaterally.  Gastrointestinal: Abdomen soft, nontender, nondistended. Normal bowel sounds. Liver and spleen are not palpable.  Musculoskeletal:  No extremity cyanosis, clubbing, or edema.  Skin:  Warm and dry.  No obvious lesions.  There is a 0.5 inch x 0.5 inch epidermoid cyst to the posterior, mid neck without any erythema or tenderness to touch.    Neurologic: Grossly intact.  " Sensation intact.   Psych: Normal mood and affect. Alert and oriented x3. Judgment and insight is normal.    Assessment/Plan   1. Herpes simplex infection of penis  This is an acute condition, stable.  Will provide patient with 7-day course of Valtrex in the event that he has a herpes simplex flare.  Discussed with patient that he will need to take the antiviral for 7 days and he must complete the course when any of his symptoms occur.  - valacyclovir (VALTREX) 1 GM Tab; Take 1 tablet by mouth 2 times a day for 7 days.  Dispense: 14 tablet; Refill: 0    2. Epidermoid cyst of neck  This is a chronic condition, stable.  Referral to dermatology for excision of cyst to the back of his neck.  - REFERRAL TO DERMATOLOGY    3. Tobacco use  This is a chronic condition, not controlled.  Will start patches to assist him with quitting tobacco use.  Begin to titrate nicotine patch dose based on how he is feeling and how he is doing with lessening the amount of cigarette use he is using.  He will let me know after 3 months how the current patch is going for him with his smoking cessation, and we will determine if we can lessen the dose from there.  Smoking cessation counseling provided to patient during this visit.  - nicotine (NICODERM) 21 MG/24HR PATCH 24 HR; Place 1 Patch on the skin every 24 hours.  Dispense: 90 Patch; Refill: 0    4. Screening for cardiovascular condition  5. Family history of hypertension  Significant family history for hypertension (father).  He is also a current tobacco user.  Screen for any cardiovascular/metabolic conditions.  - Comp Metabolic Panel; Future  - Lipid Profile; Future    6. Lipid screening  7. Family history of hyperlipidemia  Significant family history for hyperlipidemia (father).  He also is a current tobacco user.  Screen for cholesterol abnormalities.  - Comp Metabolic Panel; Future  - Lipid Profile; Future    8. Healthcare maintenance  Dimas is requesting a referral to optometry for  vision check.   - REFERRAL TO OPTOMETRY    Health Maintenance: Completed    Return in about 1 year (around 3/12/2022), or if symptoms worsen or fail to improve, for Annual physical exam.    Patient verbalized understanding and agreed to plan of care.  We have discussed to contact me with needs via MyChart or by phone if needed.      I have placed the above orders and discussed them with an approved delegating provider.  The MA is performing the below orders under the direction of Dr. Shona DO.     Please note that this dictation was created using voice recognition software. I have made every reasonable attempt to correct obvious errors, but I expect that there are errors of grammar and possibly content that I did not discover before finalizing the note.    CHARLY Velásquez  [facility]

## 2021-03-12 NOTE — ASSESSMENT & PLAN NOTE
This is a chronic condition. He smoking tobacco.  Years used:  18   Packs/day:  0.5   Previously quit:  No  --He is interested in quitting tobacco use.  He would like to start a method to help him quit tobacco.

## 2021-08-13 DIAGNOSIS — Z72.0 TOBACCO USE: ICD-10-CM

## 2021-08-13 RX ORDER — NICOTINE 21 MG/24HR
1 PATCH, TRANSDERMAL 24 HOURS TRANSDERMAL EVERY 24 HOURS
Qty: 90 PATCH | Refills: 0 | Status: SHIPPED | OUTPATIENT
Start: 2021-08-13 | End: 2021-10-19

## 2021-10-18 ENCOUNTER — APPOINTMENT (OUTPATIENT)
Dept: URGENT CARE | Facility: PHYSICIAN GROUP | Age: 36
End: 2021-10-18
Payer: COMMERCIAL

## 2021-10-19 ENCOUNTER — OFFICE VISIT (OUTPATIENT)
Dept: URGENT CARE | Facility: PHYSICIAN GROUP | Age: 36
End: 2021-10-19
Payer: COMMERCIAL

## 2021-10-19 ENCOUNTER — HOSPITAL ENCOUNTER (OUTPATIENT)
Facility: MEDICAL CENTER | Age: 36
End: 2021-10-19
Attending: NURSE PRACTITIONER
Payer: COMMERCIAL

## 2021-10-19 VITALS
OXYGEN SATURATION: 98 % | RESPIRATION RATE: 16 BRPM | HEART RATE: 78 BPM | BODY MASS INDEX: 24.05 KG/M2 | TEMPERATURE: 98.9 F | HEIGHT: 70 IN | DIASTOLIC BLOOD PRESSURE: 72 MMHG | WEIGHT: 168 LBS | SYSTOLIC BLOOD PRESSURE: 120 MMHG

## 2021-10-19 DIAGNOSIS — J06.9 VIRAL URI WITH COUGH: Primary | ICD-10-CM

## 2021-10-19 DIAGNOSIS — J00 ACUTE RHINITIS: ICD-10-CM

## 2021-10-19 DIAGNOSIS — Z20.822 EXPOSURE TO COVID-19 VIRUS: ICD-10-CM

## 2021-10-19 DIAGNOSIS — R51.9 ACUTE NONINTRACTABLE HEADACHE, UNSPECIFIED HEADACHE TYPE: ICD-10-CM

## 2021-10-19 DIAGNOSIS — Z72.0 TOBACCO USER: ICD-10-CM

## 2021-10-19 DIAGNOSIS — Z72.0 TOBACCO USE: ICD-10-CM

## 2021-10-19 LAB — COVID ORDER STATUS COVID19: NORMAL

## 2021-10-19 PROCEDURE — U0003 INFECTIOUS AGENT DETECTION BY NUCLEIC ACID (DNA OR RNA); SEVERE ACUTE RESPIRATORY SYNDROME CORONAVIRUS 2 (SARS-COV-2) (CORONAVIRUS DISEASE [COVID-19]), AMPLIFIED PROBE TECHNIQUE, MAKING USE OF HIGH THROUGHPUT TECHNOLOGIES AS DESCRIBED BY CMS-2020-01-R: HCPCS

## 2021-10-19 PROCEDURE — U0005 INFEC AGEN DETEC AMPLI PROBE: HCPCS

## 2021-10-19 PROCEDURE — 99406 BEHAV CHNG SMOKING 3-10 MIN: CPT | Performed by: NURSE PRACTITIONER

## 2021-10-19 PROCEDURE — 99214 OFFICE O/P EST MOD 30 MIN: CPT | Mod: 25,CS | Performed by: NURSE PRACTITIONER

## 2021-10-19 RX ORDER — NICOTINE 21 MG/24HR
1 PATCH, TRANSDERMAL 24 HOURS TRANSDERMAL EVERY 24 HOURS
Qty: 90 PATCH | Refills: 0 | Status: SHIPPED | OUTPATIENT
Start: 2021-10-19

## 2021-10-19 RX ORDER — NICOTINE 21 MG/24HR
1 PATCH, TRANSDERMAL 24 HOURS TRANSDERMAL EVERY 24 HOURS
Qty: 90 PATCH | Refills: 0 | OUTPATIENT
Start: 2021-10-19

## 2021-10-19 ASSESSMENT — ENCOUNTER SYMPTOMS
SHORTNESS OF BREATH: 0
MYALGIAS: 0
DIZZINESS: 0
VOMITING: 0
COUGH: 1
DIARRHEA: 0
BACK PAIN: 0
NAUSEA: 0
FEVER: 0
SORE THROAT: 0
HEADACHES: 1
CHILLS: 0
STRIDOR: 0

## 2021-10-19 ASSESSMENT — LIFESTYLE VARIABLES: SUBSTANCE_ABUSE: 0

## 2021-10-19 NOTE — PROGRESS NOTES
"Dimas Magallon is a 36 y.o. male who presents for Nasal Congestion (headache, x1 day took two home test and were positive, exposed to covid )      HPI this new problem.  Dimas is a 36-year-old male patient presents with nasal congestion, headache and general fatigue.  Mild cough starting today but he has a daily \" smoker cough\" and is trying to stop smoking.  He got his first Pfsizer covid vaccination yesterday. He thought he had mild allergies starting but otherwise felt well when he got his vaccine.  His symptoms got worse through the day and he did did a home test last night that tested positive for Covid infection and again this morning which tested positive.  He was exposed to his boss at work who tested positive for Covid infection recently.  Treatments tried: Increase fluids, rest.  No other aggravating or alleviating factors.  Dimas is here today to confirm his positive test.      Review of Systems   Constitutional: Positive for malaise/fatigue. Negative for chills and fever.   HENT: Positive for congestion. Negative for hearing loss, sore throat and tinnitus.    Respiratory: Positive for cough. Negative for shortness of breath and stridor.    Gastrointestinal: Negative for diarrhea, nausea and vomiting.   Musculoskeletal: Negative for back pain and myalgias.   Neurological: Positive for headaches. Negative for dizziness.   Endo/Heme/Allergies: Positive for environmental allergies.   Psychiatric/Behavioral: Negative for substance abuse.       Allergies:       Allergies   Allergen Reactions   • Vicodin [Hydrocodone-Acetaminophen]        PMSFS Hx:  History reviewed. No pertinent past medical history.  Past Surgical History:   Procedure Laterality Date   • EYE SURGERY     • OTHER ORTHOPEDIC SURGERY Left     thumb     Family History   Problem Relation Age of Onset   • Hyperlipidemia Father    • Hypertension Father    • Cancer Neg Hx    • Diabetes Neg Hx    • Heart Disease Neg Hx    • Stroke Neg Hx      Social History " "    Tobacco Use   • Smoking status: Current Every Day Smoker     Packs/day: 0.50     Years: 18.00     Pack years: 9.00     Types: Cigarettes   • Smokeless tobacco: Never Used   Substance Use Topics   • Alcohol use: Yes     Alcohol/week: 3.0 oz     Types: 4 Cans of beer, 1 Shots of liquor per week       Problems:   Patient Active Problem List   Diagnosis   • Herpes simplex infection of penis   • Epidermoid cyst of neck   • Tobacco use       Medications:   No current outpatient medications on file prior to visit.     No current facility-administered medications on file prior to visit.          Objective:     /72 (BP Location: Left arm, Patient Position: Sitting, BP Cuff Size: Adult)   Pulse 78   Temp 37.2 °C (98.9 °F) (Temporal)   Resp 16   Ht 1.778 m (5' 10\")   Wt 76.2 kg (168 lb)   SpO2 98%   BMI 24.11 kg/m²     Physical Exam  Vitals and nursing note reviewed.   Constitutional:       General: He is not in acute distress.     Appearance: Normal appearance. He is well-developed, well-groomed and normal weight. He is not ill-appearing or toxic-appearing.   HENT:      Head: Normocephalic and atraumatic.      Mouth/Throat:      Mouth: Mucous membranes are moist.   Eyes:      General:         Right eye: No discharge.         Left eye: No discharge.      Conjunctiva/sclera: Conjunctivae normal.      Pupils: Pupils are equal, round, and reactive to light.   Cardiovascular:      Rate and Rhythm: Normal rate and regular rhythm.      Pulses: Normal pulses.   Pulmonary:      Effort: Pulmonary effort is normal.      Breath sounds: Rhonchi (clears with cough) present.   Musculoskeletal:      Cervical back: Neck supple.   Lymphadenopathy:      Cervical: No cervical adenopathy.      Upper Body:      Right upper body: No supraclavicular adenopathy.      Left upper body: No supraclavicular adenopathy.   Skin:     General: Skin is warm and dry.      Capillary Refill: Capillary refill takes less than 2 seconds. "   Neurological:      Mental Status: He is alert and oriented to person, place, and time.   Psychiatric:         Mood and Affect: Mood normal.         Behavior: Behavior normal. Behavior is cooperative.         Thought Content: Thought content normal.         Assessment /Associated Orders:      1. Viral URI with cough     2. Exposure to COVID-19 virus  SARS-CoV-2 PCR (24 hour In-House): Collect NP swab in VTM   3. Acute nonintractable headache, unspecified headache type     4. Tobacco user     5. Acute rhinitis         Medical Decision Making:    Pt is clinically stable at today's acute urgent care visit.  No acute distress noted. Appropriate for outpatient management at this time.   Acute problem today with uncertain prognosis.   COVID PCR - pending   Self quarantine per the CDC guidelines  Increase fluid  Increase rest  Discussed that this illness was  most likely viral in nature. Did not see any evidence of a bacterial process. OTC medications can be used for symptomatic relief of symptoms. Follow manufacturers guidelines for dosing and instructions.     Trying to stop smoking. Using patches with good effect. > 3 minutes was spent in educating pt of  health risks  associated with tobacco, nicotine, and vaping. Verbalized understanding. Continue with nicotine patches and working with PCP to stop smoking.       Advised to follow-up with the primary care provider for recheck, reevaluation, and consideration of further management if necessary.   Discussed management options (risks,benefits, and alternatives to treatment). Expressed understanding and the treatment plan was agreed upon. Questions were encouraged and answered   Return to urgent care prn if new or worsening sx or if there is no improvement in condition prn.    Educated in Red flags and indications to immediately call 911 or present to the Emergency Department.     I personally reviewed prior external notes and test results pertinent to today's visit.  I  have independently reviewed and interpreted all diagnostics ordered during this urgent care acute visit.   Time spent evaluating this patient was at least 30 minutes and includes preparing for visit, counseling/education, exam and evaluation, obtaining history, independent interpretation, ordering lab/test/procedures,medication management and documentation.Time does not include separately billable procedures noted .

## 2021-10-20 LAB
SARS-COV-2 RNA RESP QL NAA+PROBE: DETECTED
SPECIMEN SOURCE: ABNORMAL